# Patient Record
Sex: MALE | Race: OTHER
[De-identification: names, ages, dates, MRNs, and addresses within clinical notes are randomized per-mention and may not be internally consistent; named-entity substitution may affect disease eponyms.]

---

## 2021-02-25 ENCOUNTER — HOSPITAL ENCOUNTER (EMERGENCY)
Dept: HOSPITAL 54 - ER | Age: 36
Discharge: HOME | End: 2021-02-25
Payer: COMMERCIAL

## 2021-02-25 VITALS — BODY MASS INDEX: 27.28 KG/M2 | HEIGHT: 68 IN | WEIGHT: 180 LBS

## 2021-02-25 VITALS — SYSTOLIC BLOOD PRESSURE: 132 MMHG | DIASTOLIC BLOOD PRESSURE: 74 MMHG

## 2021-02-25 DIAGNOSIS — R21: ICD-10-CM

## 2021-02-25 DIAGNOSIS — Z60.2: ICD-10-CM

## 2021-02-25 DIAGNOSIS — R30.0: Primary | ICD-10-CM

## 2021-02-25 DIAGNOSIS — Z79.899: ICD-10-CM

## 2021-02-25 PROCEDURE — 99283 EMERGENCY DEPT VISIT LOW MDM: CPT

## 2021-02-25 PROCEDURE — 96372 THER/PROPH/DIAG INJ SC/IM: CPT

## 2021-02-25 PROCEDURE — 87491 CHLMYD TRACH DNA AMP PROBE: CPT

## 2021-02-25 PROCEDURE — 87591 N.GONORRHOEAE DNA AMP PROB: CPT

## 2021-02-25 RX ADMIN — CEFTRIAXONE SODIUM ONE MG: 500 INJECTION, POWDER, FOR SOLUTION INTRAMUSCULAR; INTRAVENOUS at 12:16

## 2021-02-25 RX ADMIN — DOXYCYCLINE HYCLATE ONE MG: 100 TABLET, COATED ORAL at 12:16

## 2021-02-25 SDOH — SOCIAL STABILITY - SOCIAL INSECURITY: PROBLEMS RELATED TO LIVING ALONE: Z60.2

## 2021-02-25 NOTE — NUR
C/O PENILE PAIN & SCROTAL SWELLING X 1 MONTH. PT AAOX4, VSS. RR EVEN & 
UNLABORED. DENIES ANY OTHER DISCOMFORT. AWAITING EVAL BY ERMD. WILL CONT TO 
MONITOR.

## 2021-04-22 ENCOUNTER — HOSPITAL ENCOUNTER (EMERGENCY)
Dept: HOSPITAL 54 - ER | Age: 36
Discharge: HOME | End: 2021-04-22
Payer: COMMERCIAL

## 2021-04-22 VITALS — DIASTOLIC BLOOD PRESSURE: 78 MMHG | SYSTOLIC BLOOD PRESSURE: 140 MMHG

## 2021-04-22 VITALS — HEIGHT: 69 IN | WEIGHT: 180 LBS | BODY MASS INDEX: 26.66 KG/M2

## 2021-04-22 DIAGNOSIS — Z60.2: ICD-10-CM

## 2021-04-22 DIAGNOSIS — R50.9: ICD-10-CM

## 2021-04-22 DIAGNOSIS — Z79.899: ICD-10-CM

## 2021-04-22 DIAGNOSIS — R05: Primary | ICD-10-CM

## 2021-04-22 SDOH — SOCIAL STABILITY - SOCIAL INSECURITY: PROBLEMS RELATED TO LIVING ALONE: Z60.2

## 2021-04-22 NOTE — NUR
Pt bibself c/o fever and chills two days ago. Pt aaox4 breathing evenly and 
unlabored. Pt is currently afrebrile and pt states " i didnt have a 
thermometer, but i felt chills". Pt attached to monitor and pox. Skin is warm, 
dry, and intact. Pt given blanket and call light within reach

## 2021-11-07 ENCOUNTER — HOSPITAL ENCOUNTER (EMERGENCY)
Dept: HOSPITAL 54 - ER | Age: 36
Discharge: HOME | End: 2021-11-07
Payer: COMMERCIAL

## 2021-11-07 VITALS — HEIGHT: 68 IN | WEIGHT: 184 LBS | BODY MASS INDEX: 27.89 KG/M2

## 2021-11-07 VITALS — SYSTOLIC BLOOD PRESSURE: 130 MMHG | DIASTOLIC BLOOD PRESSURE: 84 MMHG

## 2021-11-07 DIAGNOSIS — J45.909: Primary | ICD-10-CM

## 2021-11-07 DIAGNOSIS — Z60.2: ICD-10-CM

## 2021-11-07 DIAGNOSIS — Z79.899: ICD-10-CM

## 2021-11-07 PROCEDURE — 94640 AIRWAY INHALATION TREATMENT: CPT

## 2021-11-07 PROCEDURE — 71045 X-RAY EXAM CHEST 1 VIEW: CPT

## 2021-11-07 PROCEDURE — 99285 EMERGENCY DEPT VISIT HI MDM: CPT

## 2021-11-07 SDOH — SOCIAL STABILITY - SOCIAL INSECURITY: PROBLEMS RELATED TO LIVING ALONE: Z60.2

## 2021-11-07 NOTE — NUR
BIB SELF C/O SOB X1 HR. DENIES HX OF ASTHMA OR SMOKING NO SIGNIFICANT PRECEDING 
EVENTS. PT FEELS SOB AT REST. REPORTS HAVING A PRODUCTIVE COUGH X2DAYS. O2 SAT 
93% RA PT ABLE TO SPEAK IN FULL SENTENCES AND APPEARS TO BE IN MILD RESPIRITORY 
DISTRESS. RT CALLED FOR BREATHING TREATMENT. MD WAS AT BEDSIDE FOR EVAL.

## 2021-11-07 NOTE — NUR
PT REPORTS IMPROVEMENT IN BREATHING FOLLWING BREATHING TREATMENT. WHEEZES 
IMPROVED BILATERALLY. O2 SAT 99% RA.